# Patient Record
Sex: MALE | Race: AMERICAN INDIAN OR ALASKA NATIVE
[De-identification: names, ages, dates, MRNs, and addresses within clinical notes are randomized per-mention and may not be internally consistent; named-entity substitution may affect disease eponyms.]

---

## 2020-08-04 ENCOUNTER — HOSPITAL ENCOUNTER (OUTPATIENT)
Dept: HOSPITAL 5 - OR | Age: 40
Discharge: HOME | End: 2020-08-04
Attending: SURGERY
Payer: COMMERCIAL

## 2020-08-04 VITALS — DIASTOLIC BLOOD PRESSURE: 96 MMHG | SYSTOLIC BLOOD PRESSURE: 157 MMHG

## 2020-08-04 DIAGNOSIS — I13.2: Primary | ICD-10-CM

## 2020-08-04 DIAGNOSIS — I50.9: ICD-10-CM

## 2020-08-04 DIAGNOSIS — Z72.89: ICD-10-CM

## 2020-08-04 DIAGNOSIS — E11.22: ICD-10-CM

## 2020-08-04 DIAGNOSIS — Z79.899: ICD-10-CM

## 2020-08-04 DIAGNOSIS — F17.210: ICD-10-CM

## 2020-08-04 DIAGNOSIS — N18.6: ICD-10-CM

## 2020-08-04 DIAGNOSIS — Z79.4: ICD-10-CM

## 2020-08-04 DIAGNOSIS — D64.9: ICD-10-CM

## 2020-08-04 DIAGNOSIS — Z99.2: ICD-10-CM

## 2020-08-04 DIAGNOSIS — Z98.890: ICD-10-CM

## 2020-08-04 DIAGNOSIS — K21.9: ICD-10-CM

## 2020-08-04 LAB
BUN SERPL-MCNC: 63 MG/DL (ref 9–20)
BUN/CREAT SERPL: 3 %
CALCIUM SERPL-MCNC: 7.4 MG/DL (ref 8.4–10.2)
HCT VFR BLD CALC: 28.5 % (ref 35.5–45.6)
HEMOLYSIS INDEX: 3
HGB BLD-MCNC: 9.8 GM/DL (ref 11.8–15.2)
MCHC RBC AUTO-ENTMCNC: 34 % (ref 32–34)
MCV RBC AUTO: 95 FL (ref 84–94)
PLATELET # BLD: 366 K/MM3 (ref 140–440)
RBC # BLD AUTO: 3 M/MM3 (ref 3.65–5.03)

## 2020-08-04 PROCEDURE — 82962 GLUCOSE BLOOD TEST: CPT

## 2020-08-04 PROCEDURE — C1768 GRAFT, VASCULAR: HCPCS

## 2020-08-04 PROCEDURE — 85027 COMPLETE CBC AUTOMATED: CPT

## 2020-08-04 PROCEDURE — 36415 COLL VENOUS BLD VENIPUNCTURE: CPT

## 2020-08-04 PROCEDURE — 80048 BASIC METABOLIC PNL TOTAL CA: CPT

## 2020-08-04 PROCEDURE — 64450 NJX AA&/STRD OTHER PN/BRANCH: CPT

## 2020-08-04 PROCEDURE — C1757 CATH, THROMBECTOMY/EMBOLECT: HCPCS

## 2020-08-04 PROCEDURE — 36830 ARTERY-VEIN NONAUTOGRAFT: CPT

## 2020-08-04 PROCEDURE — A4649 SURGICAL SUPPLIES: HCPCS

## 2020-08-04 NOTE — ANESTHESIA CONSULTATION
Anesthesia Consult and Med Hx


Date of service: 08/04/20





- Airway


Anesthetic Teeth Evaluation: Good


ROM Head & Neck: Adequate


Mental/Hyoid Distance: Adequate


Mallampati Class: Class III


Intubation Access Assessment: Possibly Difficult





- Pulmonary Exam


CTA: Yes





- Cardiac Exam


Cardiac Exam: RRR





- Pre-Operative Health Status


ASA Pre-Surgery Classification: ASA3


Proposed Anesthetic Plan: MAC


Nerve Block: Suraclavicular





- Pulmonary


Hx Smoking: Yes (cigars)


Hx Respiratory Symptoms: No


Hx Sleep Apnea: No (VICKY HIGH ON PRESCREEN- SNORES)





- Cardiovascular System


Hx Hypertension: Yes (took antihypertensives this morning)


Hx Heart Attack/AMI: No (normal EF on 11/2018 TTE)


Hx Percutaneous Transluminal Coronary Angioplasty (PTCA): No


Hx Cardia Arrhythmia: No


Hx Valvular Heart Disease: No





- Central Nervous System


CVA: No





- Gastrointestinal


Hx Gastroesophageal Reflux Disease: Yes





- Endocrine


Hx End Stage Renal Disease: Yes (last PD 8/3 (nightly PD))


Hx Liver Disease: No


Hx Insulin Dependent Diabetes: Yes


Hx Thyroid Disease: No





- Hematic


Hx Anemia: Yes





- Other Systems


Hx Obesity: Yes (BMI 30)





- Additional Comments


Anesthesia Medical History Comments: No hx anesthetic complications.

## 2020-08-04 NOTE — ANESTHESIA DAY OF SURGERY
Anesthesia Day of Surgery





- Day of Surgery


Patient Examined: Yes


Patient H&P Reviewed: Yes


Patient is NPO: Yes


Beta Blockers: Yes (carvedilol today AM)

## 2020-08-04 NOTE — SHORT STAY SUMMARY
Short Stay Documentation


Date of service: 08/04/20


Narrative H&P: 





See H&P





- History


H&P: obtained from office





- Allergies and Medications


Current Medications: 


                                    Allergies





No Known Allergies Allergy (Unverified 07/29/20 16:20)


   





                                Home Medications











 Medication  Instructions  Recorded  Confirmed  Last Taken  Type


 


Calcium Acetate [Phoslo] 3 cap PO TID 07/29/20 07/29/20 Unknown History


 


Ergocalciferol [Vitamin D2] 1 cap PO 1XW 07/29/20 07/29/20 Unknown History


 


Ergocalciferol [Vitamin D2] 1 cap PO PRN 07/29/20 07/29/20 Unknown History


 


Lispro Insulin [HumaLOG] 0 units SQ PRN 07/29/20 07/29/20 Unknown History


 


Olopatadine HCl 2 puff INNOSTRIL PRN 07/29/20 07/29/20 Unknown History


 


Torsemide 20 mg PO BID 07/29/20 08/04/20 08/04/20 05:00 History


 


Toujeo Solostar 10 units SQ HS 07/29/20 07/29/20 Unknown History


 


carvediloL [Coreg] 12.5 mg PO BID 07/29/20 08/04/20 08/04/20 05:00 History


 


cloNIDine-TTS PATCH [Catapres-Tts 0.2 mg TRANSDERMA 1XW 07/29/20 08/04/20 08/01/20 09:00 History





0.2mg Patch]     


 


hydrALAZINE 100 mg PO TID 07/29/20 08/04/20 08/04/20 05:00 History








Active Medications





Fentanyl (Sublimaze)  100 mcg IV ONCE PRN


   PRN Reason: sedation for nerve block


   Last Admin: 08/04/20 07:26 Dose:  100 mcg


   Documented by: 


Cefazolin Sodium (Ancef/Sterile Water 2 Gm/20 Ml)  2 gm in 20 mls @ 80 mls/hr IV

PREOP NR; Protocol


   Stop: 08/04/20 20:00


Sodium Chloride (Nacl 0.9% 1000 Ml)  1,000 mls @ 42 mls/hr IV AS DIRECT TIM


   Stop: 08/04/20 23:59


   Last Admin: 08/04/20 07:25 Dose:  42 mls/hr


   Documented by: 


Midazolam HCl (Versed)  2 mg IV PREOP NR


   Stop: 08/04/20 23:59


   Last Admin: 08/04/20 07:26 Dose:  2 mg


   Documented by: 


Scopolamine (Transderm-Scop)  1 each TD PREOP NR


   Stop: 08/04/20 23:59


   Last Admin: 08/04/20 06:45 Dose:  1 each


   Documented by: 











- Brief post op/procedure progress note


Date of procedure: 08/04/20


Pre-op diagnosis: End-Stage Renal Disease


Post-op diagnosis: same


Procedure: 





Creation of Left Brachial Artery to Axillary Vein Arteriovenous Graft with 6 mm 

Bovine Artegraft


Anesthesia: MAC, regional


Findings: 





Cephalic vein was not usable for creation of arteriovenous graft


Surgeon: BARB ZAIDI


Estimated blood loss: 50-100ml


Pathology: none


Condition: stable





- Disposition


Condition at discharge: Good


Disposition: DC-01 TO HOME OR SELFCARE





Short Stay Discharge Plan


Activity: other (No heavy lifting with left arm for 2 weeks)


Wound: open to air, keep clean and dry, other (Okay to wash the wounds with soap

and water but do not soak in water for 2 weeks)


Follow up with: 


BARB ZAIDI MD [Staff Physician] - 14 Days


Prescriptions: 


HYDROcodone/APAP 7.5-325 [[place] 7.5/325] 1 each PO Q6HR PRN #40 tablet


 PRN Reason: Pain

## 2020-08-04 NOTE — OPERATIVE REPORT
Operative Report


Operative Report: 





Date of procedure: 8/4/2020





Pre-operative diagnosis: End-Stage Renal Disease





Post-operative diagnosis: Same





Procedure(s):


1.  Creation of Left Brachial Artery to Axillary Vein AV Graft with 6 mm Bovine 

Graft Artergraft





Surgeon: Scott James MD





Assistant: None





Anesthesia: Regional/MAC





EBL: Minimal





Counts: Correct





Complications: None





Condition: Stable





Findings: Successful Creation of Left Arm AV Graft.  The cephalic vein was not 

usable for creation of an arteriovenous fistula.   





Specimen: None





Indication:





The patient is a 40-year-old male with a history of end-stage renal disease who 

is currently on peritoneal dialysis.  He is currently not getting adequate 

clearance with his peritoneal dialysis and will require conversion to 

hemodialysis.  He had a vein mapping that demonstrated marginally sized veins in

his left upper extremity.  He was given the risk, benefits, and alternative proc

edures of creation of an arteriovenous access in his left upper extremity and 

consented to the procedure.





Description of Procedure:  





A regional block was performed in the preoperative area prior to transporting 

the patient to the operating room.  After a regional block was performed the 

patient was transported to the operating room and adequately sedated.  After a 

timeout was performed the patient's left arm was prepped and draped in normal 

sterile fashion.  A transverse incision was created just below the antecubital 

crease and carried down to the cephalic vein using sharp dissection.  The vein 

in this incision was atretic so I created a longitudinal of the vein in the 

forearm and dissected us or fortunately.  I dissected out the brachial artery 

and the antecubital incision.  I dissected the artery circumferentially and 

controlled with Vesseloops.  I made attempts to pass a 2 and 3 Bo through 

the cephalic vein without success so the decision was made to abandon attempts 

to create an arteriovenous fistula.  I decided to create an arteriovenous graft.

 A second incision was created in longitudinal fashion on the medial aspect of 

the arm just distal to the axillary crease and carried down to the axillary vein

using sharp dissection.  Axillary vein was dissected out circumferentially and 

controlled with a vessel loop.  I then used a Sheyla-Wick tunneler to tunnel from

the brachial artery incision to the axillary vein incision and then put an 6 mm 

bovine through the tunnel.  I infused with heparinized saline to ensure that it 

was not twisted or kinked.  I put the brachial artery vessel loops on tension 

controlling the flow and then created an arteriotomy using an 11 blade and Antunez

scissors.  I beveled the graft and created an end-to-side anastomosis using 6-0 

Prolene running fashion.  I clamped the graft just proximal to the anastomosis 

and then released the vessel loops restoring flow in the brachial artery.  I 

placed quick clot in incision to achieve hemostasis.  I cut the proximal end of 

the graft to the appropriate length and beveled the graft in preparation for a 

venous anastomosis.  I controlled the axillary vein a Satinsky clamp and created

a venotomy using an 11 blade and Antunez scissors.  I created an end to side 

anastomosis using a 6-0 Prolene in running fashion.  Prior to completing the 

anastomosis I flushed the graft to ensure there was no thrombus and then 

completed the anastamosis.  I released all clamps allowing flow into the AV 

graft which had an excellent thrill.  Hemostasis within the wound was achieved 

with a combination of quick clot and Shubham.  Once hemostasis was achieved all 

wounds were closed in 2 layers using 3-0 Vicryl in running fashion in the deep 

dermal layer and 4-0 Monocryl in running fashion the subcuticular layer.  I 

dressed the wounds with Dermabond.  The patient tolerated the procedure well.  

All sponge, needle, and instrument counts were correct.  The patient was taken 

to recovery area in stable condition.

## 2021-02-23 ENCOUNTER — HOSPITAL ENCOUNTER (OUTPATIENT)
Dept: HOSPITAL 5 - OR | Age: 41
Discharge: HOME | End: 2021-02-23
Attending: SURGERY
Payer: COMMERCIAL

## 2021-02-23 VITALS — SYSTOLIC BLOOD PRESSURE: 161 MMHG | DIASTOLIC BLOOD PRESSURE: 94 MMHG

## 2021-02-23 DIAGNOSIS — Z98.890: ICD-10-CM

## 2021-02-23 DIAGNOSIS — E11.22: ICD-10-CM

## 2021-02-23 DIAGNOSIS — I13.2: ICD-10-CM

## 2021-02-23 DIAGNOSIS — Z79.899: ICD-10-CM

## 2021-02-23 DIAGNOSIS — Z99.2: ICD-10-CM

## 2021-02-23 DIAGNOSIS — K21.9: ICD-10-CM

## 2021-02-23 DIAGNOSIS — Z79.4: ICD-10-CM

## 2021-02-23 DIAGNOSIS — I50.9: ICD-10-CM

## 2021-02-23 DIAGNOSIS — D64.9: ICD-10-CM

## 2021-02-23 DIAGNOSIS — N18.6: ICD-10-CM

## 2021-02-23 DIAGNOSIS — Z72.89: ICD-10-CM

## 2021-02-23 DIAGNOSIS — M79.89: ICD-10-CM

## 2021-02-23 DIAGNOSIS — M79.81: Primary | ICD-10-CM

## 2021-02-23 DIAGNOSIS — Z87.891: ICD-10-CM

## 2021-02-23 LAB
BUN SERPL-MCNC: 36 MG/DL (ref 9–20)
BUN/CREAT SERPL: 4 %
CALCIUM SERPL-MCNC: 8 MG/DL (ref 8.4–10.2)
HCT VFR BLD CALC: 31.6 % (ref 35.5–45.6)
HEMOLYSIS INDEX: 5
HGB BLD-MCNC: 10.4 GM/DL (ref 11.8–15.2)
MCHC RBC AUTO-ENTMCNC: 33 % (ref 32–34)
MCV RBC AUTO: 104 FL (ref 84–94)
PLATELET # BLD: 174 K/MM3 (ref 140–440)
RBC # BLD AUTO: 3.04 M/MM3 (ref 3.65–5.03)

## 2021-02-23 PROCEDURE — 36415 COLL VENOUS BLD VENIPUNCTURE: CPT

## 2021-02-23 PROCEDURE — 85027 COMPLETE CBC AUTOMATED: CPT

## 2021-02-23 PROCEDURE — 82962 GLUCOSE BLOOD TEST: CPT

## 2021-02-23 PROCEDURE — C1757 CATH, THROMBECTOMY/EMBOLECT: HCPCS

## 2021-02-23 PROCEDURE — 88304 TISSUE EXAM BY PATHOLOGIST: CPT

## 2021-02-23 PROCEDURE — 80048 BASIC METABOLIC PNL TOTAL CA: CPT

## 2021-02-23 PROCEDURE — 10140 I&D HMTMA SEROMA/FLUID COLLJ: CPT

## 2021-02-23 NOTE — SHORT STAY SUMMARY
Short Stay Documentation


Date of service: 02/23/21


Narrative H&P: 





See H&P





- History


H&P: obtained from office





- Allergies and Medications


Current Medications: 


                                    Allergies





No Known Allergies Allergy (Unverified 07/29/20 16:20)


   





                                Home Medications











 Medication  Instructions  Recorded  Confirmed  Last Taken  Type


 


Calcium Acetate [Phoslo] 3 cap PO TID 07/29/20 07/29/20 Unknown History


 


Ergocalciferol [Vitamin D2] 1 cap PO 1XW 07/29/20 07/29/20 Unknown History


 


Lispro Insulin [HumaLOG] 0 units SQ PRN 07/29/20 07/29/20 Unknown History


 


Olopatadine HCl 2 puff INNOSTRIL PRN 07/29/20 07/29/20 Unknown History


 


Torsemide 20 mg PO BID 07/29/20 08/04/20 08/04/20 05:00 History


 


Toujeo Solostar 10 units SQ HS 07/29/20 07/29/20 Unknown History


 


carvediloL [Coreg] 12.5 mg PO BID 07/29/20 08/04/20 08/04/20 05:00 History


 


cloNIDine-TTS PATCH [Catapres-Tts 0.2 mg TRANSDERMA 1XW 07/29/20 08/04/20 08/01/20 09:00 History





0.2mg Patch]     


 


hydrALAZINE 100 mg PO TID 07/29/20 08/04/20 08/04/20 05:00 History


 


HYDROcodone/APAP 7.5-325 [[place] 1 each PO Q6HR PRN #40 tablet 08/04/20  Unknown 

Rx





7.5/325]     








Active Medications





Fentanyl (Fentanyl 100 Mcg/2 Ml Inj)  100 mcg IV ONCE TIM


   Stop: 02/23/21 21:00


Hydromorphone HCl (Hydromorphone 1 Mg/1 Ml Inj)  0.25 mg IV Q10MIN PRN


   PRN Reason: Pain, Moderate (4-6)


   Stop: 02/23/21 23:00


Hydromorphone HCl (Hydromorphone 1 Mg/1 Ml Inj)  0.5 mg IV Q10MIN PRN


   PRN Reason: Pain , Severe (7-10)


   Stop: 02/23/21 20:00


Cefazolin Sodium (Ancef/Sterile Water 2 Gm/20 Ml)  2 gm in 20 mls @ 80 mls/hr IV

PREOP NR; Protocol


   Stop: 02/23/21 20:00


Sodium Chloride (Nacl 0.9% 1000 Ml)  1,000 mls @ 42 mls/hr IV AS DIRECT TIM


Midazolam HCl (Midazolam 2 Mg/2 Ml Inj)  2 mg IV PREOP NR


   Stop: 02/23/21 23:59


Ondansetron HCl (Ondansetron 4 Mg/2 Ml Inj)  4 mg IV ONCE PRN


   PRN Reason: Nausea And Vomiting


   Stop: 02/23/21 23:00











- Brief post op/procedure progress note


Date of procedure: 02/23/21


Pre-op diagnosis: Postoperative Seroma


Post-op diagnosis: same


Procedure: 





Incision and Drainage of Left Arm Postoperative Seroma


Anesthesia: MAC, regional


Surgeon: BARB ZAIDI


Estimated blood loss: other (300ml)


Pathology: list (portion of lining of seroma cavity)


Specimen disposition: to lab


Condition: stable





- Disposition


Condition at discharge: Good


Disposition: DC-01 TO HOME OR SELFCARE





Short Stay Discharge Plan


Activity: other (No heavy lifting with left arm for 2 weeks.)


Wound: open to air, keep clean and dry, other (Okay to shower and wash the wound

with soap and water but do not soak in water for 2 weeks.)


Follow up with: 


BARB ZAIDI MD [Staff Physician] - 14 Days


Prescriptions: 


HYDROcodone/APAP 7.5-325 [[place] 7.5/325] 1 each PO Q6HR PRN #30 tablet


 PRN Reason: Pain

## 2021-02-23 NOTE — OPERATIVE REPORT
Operative Report


Operative Report: 





Date of Procedure: 2/23/2021





Pre-operative Diagnosis: Left Upper Extremity Postoperative Seroma





Post-operative Diagnosis: Same





Procedure(s):


1.  Incision and Drainage of Left Arm Postoperative Seroma





Surgeon: Scott James M.D. 





Assistant: None





Anesthesia: Regional/MAC





EBL: 300 mL





Counts: Correct





Complications: None





Condition: Stable





Findings: The fluid collection was a seroma without any evidence of purulence.





Specimen: Portion of the lining of the seroma was sent to pathology.





Indication:





The patient is a 40-year-old male with a history of end-stage renal disease who 

had a creation of a left arm arteriovenous graft several months ago.  After the 

operation he had a persistent fluid collection over the incision of the arterial

anastomosis.  He is in need of incision and drainage.  He was given the risk, 

benefits, and alternative procedures and consented to the procedure.





Description of Procedure:





Prior to proceeding to the operating room the patient had a regional block 

performed of his left arm.  He was then transported to the operating room and 

adequately sedated.  His left arm was then prepped and draped in normal sterile 

fashion.  A transverse incision was then created to his previous incision and 

upon opening the incision there was a significant amount of serous fluid that 

drained from the incision.  There was no evidence of purulence within the 

incision.  There was an obvious seroma with a well-formed cavity.  I decided to 

strip the lining to prevent reformation of the seroma.  I used Russians to grab 

the lining and cautery to begin to strip the line of the seroma however while 

stripping the lining I inadvertently entered the graft.  I used a 5 Bo with

a three-way stopcock to gain control of the hemorrhaging.  Once I was able to 

control hemorrhage I closed the hole within the graft using 5-0 Prolene in 

interrupted fashion.  I removed the Bo and then completed the closure of 

the hole.  I used cautery to cauterize the remaining lining of the seroma cavity

in hopes that this will prevent the seroma from reforming.  Once this was done 

further hemostasis within the wound was achieved with quick clot and direct 

pressure.  Once hemostasis was achieved I resected the redundant skin and then 

closed the wound in 2 layers using 3-0 Vicryl running fashion the deep dermal 

layer and 4-0 Monocryl and running fashion the subcuticular layer and then 

dressed with Dermabond.  The patient tolerated the procedure well.  All sponge, 

needle, and instrument counts were correct.  The patient was taken to the 

recovery area in stable condition.

## 2021-02-23 NOTE — ANESTHESIA CONSULTATION
Anesthesia Consult and Med Hx


Date of service: 02/23/21





- Airway


Anesthetic Teeth Evaluation: Chipped


ROM Head & Neck: Adequate


Mental/Hyoid Distance: Adequate


Mallampati Class: Class II


Intubation Access Assessment: Good





- Pre-Operative Health Status


ASA Pre-Surgery Classification: ASA3


Proposed Anesthetic Plan: General


Nerve Block: SC (SC Block; GA if needed)





- Pulmonary


Hx Smoking: Yes (cigars)


Hx Asthma: No


Hx Respiratory Symptoms: No


SOB: Yes (NONE RECENT)


COPD: No


Hx Pneumonia: No


Hx Sleep Apnea: No (VICKY HIGH ON PRESCREEN- SNORES)





- Cardiovascular System


Hx Hypertension: Yes


Hx Heart Attack/AMI: No (normal EF on 11/2018 TTE)


Hx Percutaneous Transluminal Coronary Angioplasty (PTCA): No


Hx Cardia Arrhythmia: No


Hx Pacemaker: No


Hx Internal Defibrillator: No


Hx Valvular Heart Disease: No





- Central Nervous System


Hx Seizures: No


CVA: No


Hx Back Pain: No


Hx Psychiatric Problems: No





- Gastrointestinal


Hx Gastroesophageal Reflux Disease: Yes





- Endocrine


Hx Renal Disease: Yes (Last HD Sunday)


Hx End Stage Renal Disease: Yes (AV GRAFT LEFT)


Hx Cirrhosis: No


Hx Liver Disease: No


Hx Insulin Dependent Diabetes: Yes


Hx Thyroid Disease: No





- Hematic


Hx Anemia: Yes


Hx Sickle Cell Disease: No





- Other Systems


Hx Alcohol Use: Yes (OCC. BEER)


Hx Substance Use: No


Hx Cancer: No





- Additional Comments


Anesthesia Medical History Comments: Was here [id number]

## 2022-09-13 ENCOUNTER — LAB OUTSIDE AN ENCOUNTER (OUTPATIENT)
Dept: URBAN - METROPOLITAN AREA CLINIC 118 | Facility: CLINIC | Age: 42
End: 2022-09-13

## 2022-09-13 ENCOUNTER — DASHBOARD ENCOUNTERS (OUTPATIENT)
Age: 42
End: 2022-09-13

## 2022-09-13 ENCOUNTER — OFFICE VISIT (OUTPATIENT)
Dept: URBAN - METROPOLITAN AREA CLINIC 118 | Facility: CLINIC | Age: 42
End: 2022-09-13
Payer: MEDICARE

## 2022-09-13 VITALS
DIASTOLIC BLOOD PRESSURE: 99 MMHG | SYSTOLIC BLOOD PRESSURE: 173 MMHG | BODY MASS INDEX: 26.95 KG/M2 | WEIGHT: 199 LBS | HEIGHT: 72 IN | HEART RATE: 81 BPM | TEMPERATURE: 99.3 F

## 2022-09-13 DIAGNOSIS — R19.7 DIARRHEA OF PRESUMED INFECTIOUS ORIGIN: ICD-10-CM

## 2022-09-13 DIAGNOSIS — R14.0 BLOATING: ICD-10-CM

## 2022-09-13 DIAGNOSIS — R63.4 WEIGHT LOSS: ICD-10-CM

## 2022-09-13 DIAGNOSIS — R68.81 EARLY SATIETY: ICD-10-CM

## 2022-09-13 DIAGNOSIS — R10.13 EPIGASTRIC ABDOMINAL PAIN: ICD-10-CM

## 2022-09-13 PROCEDURE — 99204 OFFICE O/P NEW MOD 45 MIN: CPT | Performed by: INTERNAL MEDICINE

## 2022-09-13 RX ORDER — HUMAN INSULIN 100 [IU]/ML
INJECT BY SUBCUTANEOUS ROUTE PER PRESCRIBER'S INSTRUCTIONS. INSULIN DOSING REQUIRES INDIVIDUALIZATION INJECTION, SUSPENSION SUBCUTANEOUS
Qty: 1 | Refills: 0 | Status: DISCONTINUED | COMMUNITY
Start: 1900-01-01

## 2022-09-13 RX ORDER — FUROSEMIDE 40 MG/1
TABLET ORAL
Qty: 0 | Refills: 0 | Status: DISCONTINUED | COMMUNITY
Start: 1900-01-01

## 2022-09-13 RX ORDER — HYDRALAZINE HYDROCHLORIDE 100 MG/1
TAKE 1 TABLET (100 MG) BY ORAL ROUTE 3 TIMES PER DAY WITH FOOD TABLET ORAL
Qty: 0 | Refills: 0 | Status: ACTIVE | COMMUNITY
Start: 1900-01-01

## 2022-09-13 RX ORDER — LOSARTAN POTASSIUM AND HYDROCHLOROTHIAZIDE 100; 12.5 MG/1; MG/1
TAKE 1 TABLET BY ORAL ROUTE ONCE DAILY TABLET, FILM COATED ORAL 1
Qty: 0 | Refills: 0 | Status: DISCONTINUED | COMMUNITY
Start: 1900-01-01

## 2022-09-13 RX ORDER — INSULIN GLARGINE 100 [IU]/ML
INJECT BY SUBCUTANEOUS ROUTE AS PER INSULIN PROTOCOL INJECTION, SOLUTION SUBCUTANEOUS
Qty: 1 | Refills: 0 | Status: DISCONTINUED | COMMUNITY
Start: 1900-01-01

## 2022-09-13 RX ORDER — PANTOPRAZOLE SODIUM 40 MG/1
1 TABLET TABLET, DELAYED RELEASE ORAL ONCE A DAY
Qty: 30 TABLET | Refills: 1 | OUTPATIENT
Start: 2022-09-13

## 2022-09-13 RX ORDER — POLYETHYLENE GLYCOL 3350, SODIUM SULFATE ANHYDROUS, SODIUM BICARBONATE, SODIUM CHLORIDE, POTASSIUM CHLORIDE 236; 22.74; 6.74; 5.86; 2.97 G/4L; G/4L; G/4L; G/4L; G/4L
AS DIRECTED POWDER, FOR SOLUTION ORAL ONCE
Qty: 1 GALLON | Refills: 0 | OUTPATIENT
Start: 2022-09-13 | End: 2022-09-14

## 2022-09-13 NOTE — HPI-TODAY'S VISIT:
This is a 41 yo male with pmh of DM, ESRD on HD  He complains early satiety   He previously had daily abdominal pain with nausea/vomiting. Onset of symptoms few years ago.   Symptoms somewhat improved but not resolved.   Patient has been dialysis, initially with PD and now with HD for the past 2-3 years.    Previously was seeing [facility] in 2020. EGD in 2020 showed fungal esophagitis and chronic gastritis.  GES in 2020 was normal.   States he has lost weight during this time with not able to eat due to throwing up.  Complains of loose stools all the time, multiple times a day.

## 2022-09-14 LAB
(TTG) AB, IGA: <1
(TTG) AB, IGG: <1
ALBUMIN/GLOBULIN RATIO: 1.7
ALBUMIN: 4.3
ALKALINE PHOSPHATASE: 80
ALT (SGPT): 20
ANTIGLIADIN ABS, IGA: <1
AST (SGOT): 18
BILIRUBIN, DIRECT: 0
BILIRUBIN, INDIRECT: 0.3
BILIRUBIN, TOTAL: 0.3
GLIADIN (DEAMIDATED) AB (IGA): <1
GLIADIN (DEAMIDATED) AB (IGG): <1
GLOBULIN: 2.6
IMMUNOGLOBULIN A: 326
PROTEIN, TOTAL: 6.9
T-TRANSGLUTAMINASE (TTG) IGA: <1

## 2022-10-21 ENCOUNTER — ERX REFILL RESPONSE (OUTPATIENT)
Dept: URBAN - METROPOLITAN AREA CLINIC 118 | Facility: CLINIC | Age: 42
End: 2022-10-21

## 2022-10-21 RX ORDER — PANTOPRAZOLE SODIUM 40 MG/1
1 TABLET TABLET, DELAYED RELEASE ORAL ONCE A DAY
Qty: 30 TABLET | Refills: 1 | OUTPATIENT

## 2022-10-21 RX ORDER — PANTOPRAZOLE SODIUM 40 MG/1
TAKE 1 TABLET BY MOUTH EVERY DAY FOR 30 DAYS TABLET, DELAYED RELEASE ORAL
Qty: 30 TABLET | Refills: 1 | OUTPATIENT

## 2022-11-28 ENCOUNTER — ERX REFILL RESPONSE (OUTPATIENT)
Dept: URBAN - METROPOLITAN AREA CLINIC 118 | Facility: CLINIC | Age: 42
End: 2022-11-28

## 2022-11-28 RX ORDER — PANTOPRAZOLE SODIUM 40 MG/1
TAKE 1 TABLET BY MOUTH EVERY DAY TABLET, DELAYED RELEASE ORAL
Qty: 30 TABLET | Refills: 1 | OUTPATIENT

## 2022-11-28 RX ORDER — PANTOPRAZOLE SODIUM 40 MG/1
TAKE 1 TABLET BY MOUTH EVERY DAY FOR 30 DAYS TABLET, DELAYED RELEASE ORAL
Qty: 30 TABLET | Refills: 1 | OUTPATIENT

## 2022-12-01 ENCOUNTER — TELEPHONE ENCOUNTER (OUTPATIENT)
Dept: URBAN - METROPOLITAN AREA CLINIC 118 | Facility: CLINIC | Age: 42
End: 2022-12-01

## 2022-12-01 ENCOUNTER — LAB OUTSIDE AN ENCOUNTER (OUTPATIENT)
Dept: URBAN - METROPOLITAN AREA CLINIC 118 | Facility: CLINIC | Age: 42
End: 2022-12-01

## 2022-12-06 ENCOUNTER — OFFICE VISIT (OUTPATIENT)
Dept: URBAN - METROPOLITAN AREA MEDICAL CENTER 16 | Facility: MEDICAL CENTER | Age: 42
End: 2022-12-06
Payer: MEDICARE

## 2022-12-06 DIAGNOSIS — K29.60 ADENOPAPILLOMATOSIS GASTRICA: ICD-10-CM

## 2022-12-06 DIAGNOSIS — R63.4 ABNORMAL INTENTIONAL WEIGHT LOSS: ICD-10-CM

## 2022-12-06 DIAGNOSIS — R19.7 ACUTE DIARRHEA: ICD-10-CM

## 2022-12-06 PROCEDURE — 43239 EGD BIOPSY SINGLE/MULTIPLE: CPT | Performed by: INTERNAL MEDICINE

## 2022-12-06 PROCEDURE — 45378 DIAGNOSTIC COLONOSCOPY: CPT | Performed by: INTERNAL MEDICINE

## 2022-12-09 ENCOUNTER — TELEPHONE ENCOUNTER (OUTPATIENT)
Dept: URBAN - METROPOLITAN AREA CLINIC 118 | Facility: CLINIC | Age: 42
End: 2022-12-09

## 2022-12-09 ENCOUNTER — LAB OUTSIDE AN ENCOUNTER (OUTPATIENT)
Dept: URBAN - METROPOLITAN AREA CLINIC 118 | Facility: CLINIC | Age: 42
End: 2022-12-09

## 2022-12-28 ENCOUNTER — ERX REFILL RESPONSE (OUTPATIENT)
Dept: URBAN - METROPOLITAN AREA CLINIC 118 | Facility: CLINIC | Age: 42
End: 2022-12-28

## 2022-12-28 RX ORDER — PANTOPRAZOLE SODIUM 40 MG/1
TAKE 1 TABLET BY MOUTH EVERY DAY TABLET, DELAYED RELEASE ORAL
Qty: 30 TABLET | Refills: 1 | OUTPATIENT

## 2023-01-27 ENCOUNTER — ERX REFILL RESPONSE (OUTPATIENT)
Dept: URBAN - METROPOLITAN AREA CLINIC 118 | Facility: CLINIC | Age: 43
End: 2023-01-27

## 2023-01-27 RX ORDER — PANTOPRAZOLE SODIUM 40 MG/1
TAKE 1 TABLET BY MOUTH EVERY DAY TABLET, DELAYED RELEASE ORAL
Qty: 90 TABLET | Refills: 1 | OUTPATIENT

## 2023-10-17 NOTE — PHYSICAL EXAM GASTROINTESTINAL
Abdomen , soft, nontender, nondistended , no guarding or rigidity , no masses palpable , normal bowel sounds , Liver and Spleen , no hepatomegaly present , spleen not palpable Partial Purse String (Simple) Text: Given the location of the defect and the characteristics of the surrounding skin a simple purse string closure was deemed most appropriate.  Undermining was performed circumferentially around the surgical defect.  A purse string suture was then placed and tightened. Wound tension only allowed a partial closure of the circular defect.